# Patient Record
Sex: FEMALE | Race: BLACK OR AFRICAN AMERICAN
[De-identification: names, ages, dates, MRNs, and addresses within clinical notes are randomized per-mention and may not be internally consistent; named-entity substitution may affect disease eponyms.]

---

## 2019-08-30 ENCOUNTER — HOSPITAL ENCOUNTER (EMERGENCY)
Dept: HOSPITAL 62 - ER | Age: 24
Discharge: HOME | End: 2019-08-30
Payer: SELF-PAY

## 2019-08-30 VITALS — SYSTOLIC BLOOD PRESSURE: 123 MMHG | DIASTOLIC BLOOD PRESSURE: 70 MMHG

## 2019-08-30 DIAGNOSIS — O99.331: ICD-10-CM

## 2019-08-30 DIAGNOSIS — O20.9: Primary | ICD-10-CM

## 2019-08-30 DIAGNOSIS — Z3A.11: ICD-10-CM

## 2019-08-30 LAB
ADD MANUAL DIFF: NO
APPEARANCE UR: (no result)
APTT PPP: YELLOW S
BASOPHILS # BLD AUTO: 0 10^3/UL (ref 0–0.2)
BASOPHILS NFR BLD AUTO: 0.4 % (ref 0–2)
BILIRUB UR QL STRIP: NEGATIVE
EOSINOPHIL # BLD AUTO: 0.2 10^3/UL (ref 0–0.6)
EOSINOPHIL NFR BLD AUTO: 3.6 % (ref 0–6)
ERYTHROCYTE [DISTWIDTH] IN BLOOD BY AUTOMATED COUNT: 15.5 % (ref 11.5–14)
GLUCOSE UR STRIP-MCNC: NEGATIVE MG/DL
HCT VFR BLD CALC: 33.6 % (ref 36–47)
HGB BLD-MCNC: 11.5 G/DL (ref 12–15.5)
KETONES UR STRIP-MCNC: NEGATIVE MG/DL
LYMPHOCYTES # BLD AUTO: 2.1 10^3/UL (ref 0.5–4.7)
LYMPHOCYTES NFR BLD AUTO: 34.6 % (ref 13–45)
MCH RBC QN AUTO: 30.7 PG (ref 27–33.4)
MCHC RBC AUTO-ENTMCNC: 34.2 G/DL (ref 32–36)
MCV RBC AUTO: 90 FL (ref 80–97)
MONOCYTES # BLD AUTO: 0.5 10^3/UL (ref 0.1–1.4)
MONOCYTES NFR BLD AUTO: 7.5 % (ref 3–13)
NEUTROPHILS # BLD AUTO: 3.3 10^3/UL (ref 1.7–8.2)
NEUTS SEG NFR BLD AUTO: 53.9 % (ref 42–78)
NITRITE UR QL STRIP: NEGATIVE
PH UR STRIP: 6 [PH] (ref 5–9)
PLATELET # BLD: 252 10^3/UL (ref 150–450)
PROT UR STRIP-MCNC: NEGATIVE MG/DL
RBC # BLD AUTO: 3.75 10^6/UL (ref 3.72–5.28)
SP GR UR STRIP: 1.02
TOTAL CELLS COUNTED % (AUTO): 100 %
UROBILINOGEN UR-MCNC: NEGATIVE MG/DL (ref ?–2)
WBC # BLD AUTO: 6.1 10^3/UL (ref 4–10.5)

## 2019-08-30 PROCEDURE — 86901 BLOOD TYPING SEROLOGIC RH(D): CPT

## 2019-08-30 PROCEDURE — 36415 COLL VENOUS BLD VENIPUNCTURE: CPT

## 2019-08-30 PROCEDURE — 99284 EMERGENCY DEPT VISIT MOD MDM: CPT

## 2019-08-30 PROCEDURE — 85025 COMPLETE CBC W/AUTO DIFF WBC: CPT

## 2019-08-30 PROCEDURE — 84702 CHORIONIC GONADOTROPIN TEST: CPT

## 2019-08-30 PROCEDURE — 86900 BLOOD TYPING SEROLOGIC ABO: CPT

## 2019-08-30 PROCEDURE — 81001 URINALYSIS AUTO W/SCOPE: CPT

## 2019-08-30 PROCEDURE — 76801 OB US < 14 WKS SINGLE FETUS: CPT

## 2019-08-30 PROCEDURE — 93976 VASCULAR STUDY: CPT

## 2019-08-30 NOTE — RADIOLOGY REPORT (SQ)
EXAM DESCRIPTION:



[facility]Lex: US LESS THAN 14 WEEKS PREGNANCY 



CLINICAL HISTORY: 

24 years  Female;  11 weeks preg, vag bleed



TECHNIQUE: 

Transabdominal and transvaginal pelvic ultrasound was performed. 



COMPARISON:

None.



FINDINGS:

Uterus: 10.3 x 7 x 8.1 cm. 

Intrauterine gestational sac is identified.

Single fetal pole is identified, CRL 3.82 cm, 10 weeks 5 days

Fetal heart rate 171 BPM

Cervix 3.1 cm, closed

No adjacent hematoma.

Right ovary: 2.8 x 1.9 x 2 cm. Normal vascular flow on Doppler.

Left ovary: 3.2 x 2.2 x 2 cm. Normal vascular flow on Doppler.

No free fluid. No adnexal masses. 



IMPRESSION:



1.  Single viable IUP, EGA 10 weeks 5 days, HILDA 03/22/2020

2.  No acute findings

## 2019-08-30 NOTE — ER DOCUMENT REPORT
ED General





- General


Chief Complaint: Vag Bleeding, +preg <12wks


Stated Complaint: BLEEDING WITH PREGNANCY


Time Seen by Provider: 08/30/19 18:40


Mode of Arrival: Ambulatory


TRAVEL OUTSIDE OF THE [place] IN LAST 30 DAYS: No





- HPI


Notes: 





Patient is a 24-year-old female G5, P0 approximately 11 weeks pregnant who 

presents complaining of cramping and vaginal spotting that began today.  Patient

has had 4 other previous miscarriages.  She is otherwise feeling well and has 

been able to eat and drink without difficulty.  She is urinating normally and 

having normal bowel movements.  Pain does not radiate.  Pain is not deemed 

severe.  No other vaginal odor or discharge.  Denies drug allergies.  Denies any

headache, fever, URI, sore throat, chest pain, palpitations, syncope, cough, sh

ortness of breath, wheeze, dyspnea, nausea/vomiting/diarrhea, urinary retention,

dysuria, hematuria, back pain, or rash.





- Related Data


Allergies/Adverse Reactions: 


                                        





No Known Allergies Allergy (Verified 08/30/19 18:20)


   











Past Medical History





- General


Information source: Patient





- Social History


Smoking Status: Current Every Day Smoker


Family History: Reviewed & Not Pertinent


Patient has suicidal ideation: No


Patient has homicidal ideation: No





Review of Systems





- Review of Systems


-: Yes All other systems reviewed and negative





Physical Exam





- Vital signs


Vitals: 


                                        











Temp Pulse Resp BP Pulse Ox


 


 98.6 F   96   18   135/68 H  100 


 


 08/30/19 18:22  08/30/19 18:22  08/30/19 18:22  08/30/19 18:22  08/30/19 18:22














- Notes


Notes: 





PHYSICAL EXAMINATION:





GENERAL: Well-appearing, well-nourished and in no acute distress.





HEAD: Atraumatic, normocephalic.





EYES: Pupils equal round and reactive to light, extraocular movements intact, 

sclera anicteric, conjunctiva are normal.





ENT:  Nares patent and without discharge.  oropharynx clear without exudates.  

No tonsilar hypertrophy or erythema.  Moist mucous membranes.  





NECK: Normal range of motion, supple without lymphadenopathy





LUNGS: Breath sounds clear to auscultation bilaterally and equal.  No wheezes 

rales or rhonchi.





HEART: Regular rate and rhythm without murmurs, rubs, gallops.





ABDOMEN: Soft, nontender, nondistended abdomen.  No guarding, no rebound.  

Normal bowel sounds present.  No CVA tenderness bilaterally.





Musculoskeletal: FROM to passive/active. Strength 5+/5. 





Extremities:  No cyanosis, clubbing, or edema b/l.  Peripheral pulses 2+.  

Capillary refill less than 3 seconds.





NEUROLOGICAL: Normal speech, normal gait.  





PSYCH: Normal mood, normal affect.





SKIN: Warm, Dry, normal turgor, no rashes or lesions noted.





Course





- Re-evaluation


Re-evalutation: 





08/30/19 21:38


Patient is an afebrile, well-hydrated, 24-year-old female who presents to the ED

with bleeding in early pregnancy and intrauterine pregnancy.  Vitals are 

acceptable without any significant tachycardia, tachypnea, or hypoxia.  PE is 

otherwise unremarkable.  CBC unremarkable for acute pathology.  HCG at [address]+.  

TVUS shows IUP approx 10wk 5 day.  UA unremarkable.  Patient is nontoxic-

appearing is tolerating p.o. without any difficulties.  No other labs or imaging

warranted at this time based on H&P.  Low suspicion/risk for acute appendicitis,

bowel obstruction, acute cholecystitis, acute cholangitis, perforated 

diverticulitis, incarcerated hernia, pancreatitis, perforated ulcer, 

peritonitis, sepsis, pelvic inflammatory disease, ectopic pregnancy, tubo-

ovarian abscess, ovarian torsion, or other systemic emergent condition at this 

time.  Patient is aware that her condition can change from initial presentation 

and she needs to monitor symptoms closely and seek medical attention if any 

acute changes.  Conservative measures otherwise for symptoms.  Recheck with your

PCM/OBGYN in 3-5 days.  Return to the ED with any worsening/concerning symptoms 

otherwise as reviewed in discharge.  Patient is in agreement.





- Vital Signs


Vital signs: 


                                        











Temp Pulse Resp BP Pulse Ox


 


 98.6 F   96   18   135/68 H  100 


 


 08/30/19 18:22  08/30/19 18:22  08/30/19 18:22  08/30/19 18:22  08/30/19 18:22














- Laboratory


Result Diagrams: 


                                 08/30/19 19:00





Laboratory results interpreted by me: 


                                        











  08/30/19 08/30/19





  19:00 19:00


 


Hgb  11.5 L 


 


Hct  33.6 L 


 


RDW  15.5 H 


 


Beta HCG, Quant   40453.00 H














Discharge





- Discharge


Clinical Impression: 


 Bleeding in early pregnancy





Condition: Stable


Disposition: HOME, SELF-CARE


Instructions:  Bleeding During Early Pregnancy ([facility])


Additional Instructions: 


Maintain fluid intake


Proper hygienic technique


Keep the skin clean


Tylenol/ibuprofen as needed


F/u with your PCM/OBGYN in 3-5 days for a recheck


Return to the ED with any development of HA/fever, trouble with vision, eye 

redness, worsening pain, urethral discharge, urinary retention, blood in the 

urine, flank pain, abdominal pain, n/v, Chest Pain, shortness of breath, joint 

pains, trouble breathing, or any other worsening/concerning symptoms as needed 

otherwise.


Forms:  Elevated Blood Pressure


Referrals: 


[facility] [Provider Group] - Follow up in 3-5 days

## 2020-01-03 ENCOUNTER — HOSPITAL ENCOUNTER (EMERGENCY)
Dept: HOSPITAL 62 - ER | Age: 25
LOS: 1 days | Discharge: HOME | End: 2020-01-04
Payer: MEDICAID

## 2020-01-03 DIAGNOSIS — F32.9: ICD-10-CM

## 2020-01-03 DIAGNOSIS — F60.9: ICD-10-CM

## 2020-01-03 DIAGNOSIS — F41.9: ICD-10-CM

## 2020-01-03 DIAGNOSIS — O99.343: Primary | ICD-10-CM

## 2020-01-03 DIAGNOSIS — Z3A.29: ICD-10-CM

## 2020-01-03 PROCEDURE — 85025 COMPLETE CBC W/AUTO DIFF WBC: CPT

## 2020-01-03 PROCEDURE — 80053 COMPREHEN METABOLIC PANEL: CPT

## 2020-01-03 PROCEDURE — 81001 URINALYSIS AUTO W/SCOPE: CPT

## 2020-01-03 PROCEDURE — 36415 COLL VENOUS BLD VENIPUNCTURE: CPT

## 2020-01-03 PROCEDURE — 87086 URINE CULTURE/COLONY COUNT: CPT

## 2020-01-03 PROCEDURE — 99284 EMERGENCY DEPT VISIT MOD MDM: CPT

## 2020-01-03 NOTE — ER DOCUMENT REPORT
ED Medical Screen (RME)





- General


Chief Complaint: Depression


Stated Complaint: PSYCH EVAL


Time Seen by Provider: 01/03/20 22:52


Mode of Arrival: Ambulatory


Information source: Patient


Notes: 





Patient presents with a history of personality disorder and depression.  Patient

states she has been off her medication for 30 days because she has not been able

to get into see her mental health provider.  Patient states she is currently 29 

weeks pregnant.  Patient denies any problems during the pregnancy at this time. 

Patient denies any suicidal or homicidal ideation.  Patient states she feels as 

though her hormones are causing her to become more unstable and she would like 

to be evaluated before things start to worsen.





I have greeted and performed a rapid initial assessment of this patient.  A 

comprehensive ED assessment and evaluation of the patient, analysis of test 

results and completion of the medical decision making process will be conducted 

by additional ED providers.


TRAVEL OUTSIDE OF THE [place] IN LAST 30 DAYS: No





- Related Data


Allergies/Adverse Reactions: 


                                        





No Known Allergies Allergy (Verified 01/03/20 22:46)


   











Physical Exam





- Vital signs


Vitals: 





                                        











Temp Pulse Resp BP Pulse Ox


 


 98.9 F   96   16   135/70 H  100 


 


 01/03/20 22:01  01/03/20 22:01  01/03/20 22:01  01/03/20 22:01  01/03/20 22:01














- General


General appearance: Appears well, Alert





- Psychological


Associated symptoms: Normal affect, Normal mood





Course





- Vital Signs


Vital signs: 





                                        











Temp Pulse Resp BP Pulse Ox


 


 98.9 F   96   16   135/70 H  100 


 


 01/03/20 22:01  01/03/20 22:01  01/03/20 22:01  01/03/20 22:01  01/03/20 22:01

## 2020-01-04 VITALS — SYSTOLIC BLOOD PRESSURE: 117 MMHG | DIASTOLIC BLOOD PRESSURE: 75 MMHG

## 2020-01-04 LAB
ADD MANUAL DIFF: NO
ALBUMIN SERPL-MCNC: 3.9 G/DL (ref 3.5–5)
ALP SERPL-CCNC: 64 U/L (ref 38–126)
ANION GAP SERPL CALC-SCNC: 9 MMOL/L (ref 5–19)
APPEARANCE UR: (no result)
APTT PPP: YELLOW S
AST SERPL-CCNC: 18 U/L (ref 14–36)
BASOPHILS # BLD AUTO: 0 10^3/UL (ref 0–0.2)
BASOPHILS NFR BLD AUTO: 0.4 % (ref 0–2)
BILIRUB DIRECT SERPL-MCNC: 0.2 MG/DL (ref 0–0.4)
BILIRUB SERPL-MCNC: 0.2 MG/DL (ref 0.2–1.3)
BILIRUB UR QL STRIP: NEGATIVE
BUN SERPL-MCNC: 9 MG/DL (ref 7–20)
CALCIUM: 9.4 MG/DL (ref 8.4–10.2)
CHLORIDE SERPL-SCNC: 101 MMOL/L (ref 98–107)
CO2 SERPL-SCNC: 24 MMOL/L (ref 22–30)
EOSINOPHIL # BLD AUTO: 0.3 10^3/UL (ref 0–0.6)
EOSINOPHIL NFR BLD AUTO: 2.3 % (ref 0–6)
ERYTHROCYTE [DISTWIDTH] IN BLOOD BY AUTOMATED COUNT: 13.5 % (ref 11.5–14)
GLUCOSE SERPL-MCNC: 93 MG/DL (ref 75–110)
GLUCOSE UR STRIP-MCNC: NEGATIVE MG/DL
HCT VFR BLD CALC: 31.4 % (ref 36–47)
HGB BLD-MCNC: 10.9 G/DL (ref 12–15.5)
KETONES UR STRIP-MCNC: NEGATIVE MG/DL
LYMPHOCYTES # BLD AUTO: 2.7 10^3/UL (ref 0.5–4.7)
LYMPHOCYTES NFR BLD AUTO: 25.1 % (ref 13–45)
MCH RBC QN AUTO: 31.4 PG (ref 27–33.4)
MCHC RBC AUTO-ENTMCNC: 34.6 G/DL (ref 32–36)
MCV RBC AUTO: 91 FL (ref 80–97)
MONOCYTES # BLD AUTO: 0.9 10^3/UL (ref 0.1–1.4)
MONOCYTES NFR BLD AUTO: 8.2 % (ref 3–13)
NEUTROPHILS # BLD AUTO: 6.9 10^3/UL (ref 1.7–8.2)
NEUTS SEG NFR BLD AUTO: 64 % (ref 42–78)
NITRITE UR QL STRIP: NEGATIVE
PH UR STRIP: 6 [PH] (ref 5–9)
PLATELET # BLD: 311 10^3/UL (ref 150–450)
POTASSIUM SERPL-SCNC: 3.9 MMOL/L (ref 3.6–5)
PROT SERPL-MCNC: 7 G/DL (ref 6.3–8.2)
PROT UR STRIP-MCNC: NEGATIVE MG/DL
RBC # BLD AUTO: 3.45 10^6/UL (ref 3.72–5.28)
SP GR UR STRIP: 1.02
TOTAL CELLS COUNTED % (AUTO): 100 %
UROBILINOGEN UR-MCNC: NEGATIVE MG/DL (ref ?–2)
WBC # BLD AUTO: 10.8 10^3/UL (ref 4–10.5)

## 2020-01-04 NOTE — PSYCHOLOGICAL NOTE
Psych Note





- Psych Note


Date seen by psych provider: 01/04/20


Time seen by psych provider: 08:15


Psych Note: 





Reason for consult: Medication recommendations for Depression 





Patient is a 24 year old female who presents to ED via POV requesting 

prescription refill of antidepressant medication. Patient is 29 weeks pregnant. 





Patient is linked with [facility] for medication management and mental health 

services. Patient states she has been trying for 30 days to refill medications 

but has been unsuccessful. Patient was prescribed Prozac 10MG, daily. Patient 

reports mental health diagnosis of Depression and Borderline Personality 

Disorder. Patient described impulsive behaviors. Patient moved here on a whim 

with little social support, patient has spent more money that needed, in the 

process of evicting roommate "for no real reason," and "can go from 0-60 quick."

Patient reports "feeling safe but not comfortable at home." Patient states "my 

roommate won't mess with me cause she knows I'm crazy." 





Patient denies suicidal and homicidal ideation. Patient reports previous suicide

attempts that coincided with miscarriages. Patient was voluntarily committed 

twice and involuntarily committed once for ETOH-Depression related issues. 

Patient states she was involved in a "group class at an inpatient place" where 

she attended classes from 8-3 Monday -Friday. Patient is requesting similar 

resource. 





Patient is alert and oriented to person, place, time and circumstance. Mood is 

euthymic with congruent affect as evidenced by laughing, smiling, and engagement

with clinician. Patient denies suicidal and homicidal ideations. Delusions are 

absent and behavior is congruent with an intact reality based presentation 

(i.e., organized and linear through processes). There is no observed behavior 

that suggests patient is responding to internal stimuli. Patient denies current 

auditory and visual hallucinations. Eye contact is good. Conversational speech 

is within normal rate, tone, and prosody. Intellectual ability appears to be 

within average range. Attention and concentration are good. Insight, judgment 

and impulse control are currently poor.








Impression/Plan: Patient is cleared from acute psychiatric services. Patient 

denies suicidal and homicidal ideations. There is no observed behavior that 

suggests patient is responding to internal stimuli. Patient denies current 

auditory and visual hallucinations. Patient is requesting refill of 

antidepressant medication. There is no observed behaviors that suggest patient 

is a danger to herself or others. Plan is for patient to follow up with her 

OBGYN for appropriate medication management and [facility] for mental health services.

Clinician spoke with patient regarding [facility] mobile crisis. Dr. Sarmiento was 

consulted on the care and management of this patient; attending physician is in 

agreement with recommendations and disposition.

## 2020-01-04 NOTE — ER DOCUMENT REPORT
ED Psych Disorder / Suicide





- General


Chief Complaint: Depression


Stated Complaint: PSYCH EVAL


Time Seen by Provider: 01/03/20 22:52


Mode of Arrival: Ambulatory


Notes: 


Patient is a 24-year-old female that comes to the emergency department for chief

complaint of feeling depressed and overwhelmed.  Patient states that she is out 

of her Prozac, she attempted to go to see [facility] earlier today but they were 

closed, she states she felt like she could not wait so she came here.  She 

states that she has started to "snap at everybody and drive everyone away", she 

states that she is constantly "stuck in my thoughts" and "crying all the time". 

She states this is worse and she is not sure if this is because she is off her 

medication or because the pregnancy is progressing with "all the hormones".  She

is G6, P0 at 29 weeks gestation.  She denies any daily medications other than 

prenatal vitamins, she states that she was on 10 mg of Prozac until she ran out.

 She states that she is become very depressed and she has had suicide attempts 

in the past and she is scared that she will progress from here.  She states she 

does not want to get to that point.  She denies homicidal ideations.  She states

she is also diagnosed with borderline personality disorder.


TRAVEL OUTSIDE OF THE [place] IN LAST 30 DAYS: No





- Related Data


Allergies/Adverse Reactions: 


                                        





No Known Allergies Allergy (Verified 01/03/20 22:46)


   











Past Medical History





- General


Information source: Patient





- Social History


Smoking Status: Former Smoker


Frequency of alcohol use: None


Drug Abuse: None


Lives with: Family


Family History: Reviewed & Not Pertinent


Patient has suicidal ideation: No


Patient has homicidal ideation: No


Psychiatric Medical History: Reports: Hx Anxiety, Hx Borderline Personality 

Disorder, Hx Depression





- Immunizations


Immunizations up to date: Yes


Hx Diphtheria, Pertussis, Tetanus Vaccination: Yes





Review of Systems





- Review of Systems


Constitutional: No symptoms reported


EENT: No symptoms reported


Cardiovascular: No symptoms reported


Respiratory: No symptoms reported


Gastrointestinal: See HPI


Genitourinary: No symptoms reported


Female Genitourinary: See HPI


Musculoskeletal: No symptoms reported


Skin: No symptoms reported


Hematologic/Lymphatic: No symptoms reported


Neurological/Psychological: See HPI





Physical Exam





- Vital signs


Vitals: 


                                        











Temp Pulse Resp BP Pulse Ox


 


 98.9 F   96   16   135/70 H  100 


 


 01/03/20 22:01  01/03/20 22:01  01/03/20 22:01  01/03/20 22:01  01/03/20 22:01














- Notes


Notes: 





GENERAL: Alert, interacts well. No acute distress.


HEAD: Normocephalic, atraumatic.


EYES: Pupils equal, round, and reactive to light. Extraocular movements intact.


ENT: Oral mucosa moist, tongue midline. Oropharynx unremarkable. Airway patent. 


LUNGS: Clear to auscultation bilaterally, no wheezes, rales, or rhonchi. No 

respiratory distress.


HEART: Regular rate and rhythm. No murmur


ABDOMEN: Soft, non-tender.  Gravid abdomen


EXTREMITIES: Moves all 4 extremities spontaneously. No edema, normal radial and 

dorsalis pedis pulses bilaterally. No cyanosis.


BACK: no cervical, thoracic, lumbar midline tenderness. No saddle anesthesia, 

normal distal neurovascular exam. Moves all extremities in full range of motion.


NEUROLOGICAL: Alert and oriented x3. Normal speech. Cranial nerves II through 

XII grossly intact. 


PSYCH: Normal affect, normal mood.


SKIN: Warm, dry, normal turgor. No rashes or lesions noted.





Course





- Re-evaluation


Re-evalutation: 


Patient was brought here by a friend, dropped off with the anticipation of her 

staying and having a psychiatric evaluation.  Patient states she specifically 

wants this and wants to be started on medications again.  She is not suicidal or

homicidal, she is cooperative.  





Because of her vomiting earlier work-up was performed but she is medically 

cleared now pending mental health team evaluation.  Patient states she gets 

anxious and sometimes vomit when she gets worked up.  She has no symptoms on 

reevaluation.  Patient is on IVC paperwork because she does not meet criteria.








- Vital Signs


Vital signs: 


                                        











Temp Pulse Resp BP Pulse Ox


 


 98.2 F   82   20   132/72 H  100 


 


 01/04/20 06:10  01/04/20 06:10  01/04/20 06:10  01/04/20 06:10  01/04/20 06:10














- Laboratory


Result Diagrams: 


                                 01/03/20 23:18





                                 01/03/20 23:18


Laboratory results interpreted by me: 


                                        











  01/03/20 01/03/20 01/03/20





  23:18 23:18 23:39


 


WBC  10.8 H  


 


RBC  3.45 L  


 


Hgb  10.9 L  


 


Hct  31.4 L  


 


Sodium   133.8 L 


 


Creatinine   0.50 L 


 


Urine Blood    SMALL H


 


Ur Leukocyte Esterase    TRACE H














Discharge





- Discharge


Clinical Impression: 


 Anxiety, Has run out of medications





Depression


Qualifiers:


 Depression Type: unspecified Qualified Code(s): F32.9 - Major depressive 

disorder, single episode, unspecified





Condition: Stable


Disposition: PSYCH HOSP/UNIT

## 2020-01-04 NOTE — ER DOCUMENT REPORT
Doctor's Note


Notes: 





01/04/20 14:53


Did speak with the patient prior to discharge; she currently denies suicidal 

homicidal ideation.  Patient denies visual or auditory hallucinations.  Patient 

reports that she attempted to follow-up with [facility] to have her Prozac refilled on

Friday but that the office was closed.  Patient states that she does santos 

depression and does have a history of his suicidal thoughts and attempts in the 

past.  Patient reports at this time she does not want to hurt herself as she is 

29 weeks pregnant and wants to live for her baby.  Patient was given multiple 

referrals to psychiatric facilities here in the [place] area.  Patient 

states she has been in contact with mobile crisis and that they told her they 

would meet with her today.  I told her to continue with this to help initiate 

follow-up.  Patient stable for discharge at this time.  Patient was given strict

return precautions if she were to develop any suicidal homicidal thoughts or 

worsening symptoms.  Patient is alert and oriented x4.  Patient nontoxic-

appearing.
